# Patient Record
Sex: FEMALE | Race: OTHER | NOT HISPANIC OR LATINO | Employment: UNEMPLOYED | URBAN - METROPOLITAN AREA
[De-identification: names, ages, dates, MRNs, and addresses within clinical notes are randomized per-mention and may not be internally consistent; named-entity substitution may affect disease eponyms.]

---

## 2022-03-11 ENCOUNTER — HOSPITAL ENCOUNTER (EMERGENCY)
Facility: HOSPITAL | Age: 22
Discharge: HOME | End: 2022-03-11
Attending: EMERGENCY MEDICINE
Payer: COMMERCIAL

## 2022-03-11 VITALS
DIASTOLIC BLOOD PRESSURE: 68 MMHG | WEIGHT: 115 LBS | HEIGHT: 64 IN | HEART RATE: 92 BPM | SYSTOLIC BLOOD PRESSURE: 97 MMHG | OXYGEN SATURATION: 100 % | BODY MASS INDEX: 19.63 KG/M2 | TEMPERATURE: 98 F | RESPIRATION RATE: 16 BRPM

## 2022-03-11 DIAGNOSIS — T14.8XXA MUSCLE STRAIN: ICD-10-CM
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
## 2022-03-11 PROCEDURE — 99282 EMERGENCY DEPT VISIT SF MDM: CPT | Performed by: NURSE PRACTITIONER

## 2022-03-11 PROCEDURE — 99284 EMERGENCY DEPT VISIT MOD MDM: CPT

## 2022-03-11 RX ORDER — IBUPROFEN 600 MG/1
600 TABLET ORAL ONCE
Status: COMPLETED | OUTPATIENT
Start: 2022-03-11 | End: 2022-03-11

## 2022-03-11 RX ADMIN — IBUPROFEN 600 MG: 600 TABLET ORAL at 13:55
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
## 2022-03-23 ENCOUNTER — APPOINTMENT (EMERGENCY)
Dept: VASCULAR ULTRASOUND | Facility: HOSPITAL | Age: 22
End: 2022-03-23
Payer: COMMERCIAL

## 2022-03-23 ENCOUNTER — HOSPITAL ENCOUNTER (EMERGENCY)
Facility: HOSPITAL | Age: 22
Discharge: HOME | End: 2022-03-23
Attending: EMERGENCY MEDICINE
Payer: COMMERCIAL

## 2022-03-23 VITALS
HEART RATE: 96 BPM | SYSTOLIC BLOOD PRESSURE: 103 MMHG | TEMPERATURE: 98.6 F | RESPIRATION RATE: 18 BRPM | OXYGEN SATURATION: 99 % | DIASTOLIC BLOOD PRESSURE: 62 MMHG

## 2022-03-23 DIAGNOSIS — M79.605 LEFT LEG PAIN: Primary | ICD-10-CM

## 2022-03-23 PROCEDURE — 99284 EMERGENCY DEPT VISIT MOD MDM: CPT | Performed by: PHYSICIAN ASSISTANT

## 2022-03-23 PROCEDURE — 93971 EXTREMITY STUDY: CPT | Performed by: SURGERY

## 2022-03-23 PROCEDURE — 99283 EMERGENCY DEPT VISIT LOW MDM: CPT

## 2022-03-23 PROCEDURE — 93971 EXTREMITY STUDY: CPT

## 2022-03-23 RX ORDER — METHOCARBAMOL 750 MG/1
750 TABLET, FILM COATED ORAL EVERY 6 HOURS PRN
Qty: 20 TABLET | Refills: 0 | Status: SHIPPED | OUTPATIENT
Start: 2022-03-23 | End: 2022-03-28
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
## 2022-03-23 NOTE — Clinical Note
Juan David Meredith was seen and treated in our emergency department on 3/23/2022  Diagnosis:     Attila Walden  may return to work on return date  She may return on this date: 03/24/2022         If you have any questions or concerns, please don't hesitate to call        Patty Lewis RN    ______________________________           _______________          _______________  Hospital Representative                              Date                                Time

## 2022-03-31 ENCOUNTER — OFFICE VISIT (OUTPATIENT)
Dept: FAMILY MEDICINE CLINIC | Facility: CLINIC | Age: 22
End: 2022-03-31
Payer: COMMERCIAL

## 2022-03-31 ENCOUNTER — APPOINTMENT (OUTPATIENT)
Dept: RADIOLOGY | Facility: CLINIC | Age: 22
End: 2022-03-31
Payer: COMMERCIAL

## 2022-03-31 VITALS
TEMPERATURE: 96 F | HEART RATE: 101 BPM | DIASTOLIC BLOOD PRESSURE: 70 MMHG | OXYGEN SATURATION: 93 % | BODY MASS INDEX: 19.87 KG/M2 | WEIGHT: 116.4 LBS | HEIGHT: 64 IN | SYSTOLIC BLOOD PRESSURE: 112 MMHG

## 2022-03-31 DIAGNOSIS — M54.9 MID BACK PAIN: ICD-10-CM

## 2022-03-31 DIAGNOSIS — Z00.00 ENCOUNTER FOR MEDICAL EXAMINATION TO ESTABLISH CARE: ICD-10-CM

## 2022-03-31 DIAGNOSIS — M79.605 LEFT LEG PAIN: ICD-10-CM

## 2022-03-31 DIAGNOSIS — M79.605 LEFT LEG PAIN: Primary | ICD-10-CM

## 2022-03-31 DIAGNOSIS — Z13.220 SCREENING, LIPID: ICD-10-CM

## 2022-03-31 DIAGNOSIS — Z12.4 PAP SMEAR FOR CERVICAL CANCER SCREENING: ICD-10-CM

## 2022-03-31 DIAGNOSIS — Z13.1 SCREENING FOR DIABETES MELLITUS (DM): ICD-10-CM

## 2022-03-31 PROCEDURE — 72072 X-RAY EXAM THORAC SPINE 3VWS: CPT

## 2022-03-31 PROCEDURE — 99204 OFFICE O/P NEW MOD 45 MIN: CPT | Performed by: PHYSICIAN ASSISTANT

## 2022-03-31 PROCEDURE — 73590 X-RAY EXAM OF LOWER LEG: CPT
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
## 2022-04-01 ENCOUNTER — TELEPHONE (OUTPATIENT)
Dept: OBGYN CLINIC | Facility: HOSPITAL | Age: 22
End: 2022-04-01
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
## 2022-04-22 ENCOUNTER — OFFICE VISIT (OUTPATIENT)
Dept: OBGYN CLINIC | Facility: CLINIC | Age: 22
End: 2022-04-22
Payer: COMMERCIAL

## 2022-04-22 VITALS
HEART RATE: 89 BPM | DIASTOLIC BLOOD PRESSURE: 76 MMHG | WEIGHT: 115 LBS | SYSTOLIC BLOOD PRESSURE: 109 MMHG | BODY MASS INDEX: 19.63 KG/M2 | HEIGHT: 64 IN

## 2022-04-22 DIAGNOSIS — M62.838 TRAPEZIUS MUSCLE SPASM: ICD-10-CM

## 2022-04-22 DIAGNOSIS — S39.012A LUMBAR STRAIN, INITIAL ENCOUNTER: ICD-10-CM

## 2022-04-22 DIAGNOSIS — S16.1XXA CERVICAL STRAIN, INITIAL ENCOUNTER: Primary | ICD-10-CM

## 2022-04-22 DIAGNOSIS — S29.012A STRAIN OF THORACIC BACK REGION: ICD-10-CM

## 2022-04-22 DIAGNOSIS — S80.02XA CONTUSION OF LEFT KNEE, INITIAL ENCOUNTER: ICD-10-CM

## 2022-04-22 DIAGNOSIS — M62.838 CERVICAL PARASPINAL MUSCLE SPASM: ICD-10-CM

## 2022-04-22 PROCEDURE — 99203 OFFICE O/P NEW LOW 30 MIN: CPT | Performed by: FAMILY MEDICINE

## 2022-04-22 RX ORDER — NAPROXEN 500 MG/1
500 TABLET ORAL 2 TIMES DAILY WITH MEALS
Qty: 60 TABLET | Refills: 0 | Status: SHIPPED | OUTPATIENT
Start: 2022-04-22
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
## 2022-05-27 ENCOUNTER — OFFICE VISIT (OUTPATIENT)
Dept: OBGYN CLINIC | Facility: CLINIC | Age: 22
End: 2022-05-27
Payer: COMMERCIAL

## 2022-05-27 VITALS
BODY MASS INDEX: 19.81 KG/M2 | HEART RATE: 83 BPM | WEIGHT: 116 LBS | HEIGHT: 64 IN | SYSTOLIC BLOOD PRESSURE: 100 MMHG | DIASTOLIC BLOOD PRESSURE: 69 MMHG

## 2022-05-27 DIAGNOSIS — S29.012A STRAIN OF THORACIC BACK REGION: ICD-10-CM

## 2022-05-27 DIAGNOSIS — S39.012D LUMBAR STRAIN, SUBSEQUENT ENCOUNTER: ICD-10-CM

## 2022-05-27 DIAGNOSIS — M62.838 TRAPEZIUS MUSCLE SPASM: ICD-10-CM

## 2022-05-27 DIAGNOSIS — S80.02XD CONTUSION OF LEFT KNEE, SUBSEQUENT ENCOUNTER: ICD-10-CM

## 2022-05-27 DIAGNOSIS — S16.1XXD CERVICAL STRAIN, SUBSEQUENT ENCOUNTER: Primary | ICD-10-CM

## 2022-05-27 DIAGNOSIS — M62.838 CERVICAL PARASPINAL MUSCLE SPASM: ICD-10-CM

## 2022-05-27 PROCEDURE — 99213 OFFICE O/P EST LOW 20 MIN: CPT | Performed by: FAMILY MEDICINE

## 2022-05-27 RX ORDER — NAPROXEN 500 MG/1
500 TABLET ORAL 2 TIMES DAILY WITH MEALS
Qty: 60 TABLET | Refills: 0 | Status: SHIPPED | OUTPATIENT
Start: 2022-05-27

## 2022-05-27 NOTE — PATIENT INSTRUCTIONS
Prescription for Naproxen 500 mg sent to pharmacy  Take twice day with food, everyday for 2 weeks, and then take as needed    Alternate between heat and ice    Youtube- neck and back stretching exercises by physical therapy
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
## 2023-01-09 ENCOUNTER — TELEPHONE (OUTPATIENT)
Dept: OBGYN CLINIC | Facility: HOSPITAL | Age: 23
End: 2023-01-09

## 2023-01-09 NOTE — TELEPHONE ENCOUNTER
Caller: Ashley    Doctor: orion    Reason for call: patient calling to request medical records number    Call back#: na

## 2023-01-20 NOTE — TELEPHONE ENCOUNTER
Caller: Patient    Doctor: Steven Bateman    Reason for call: Patient requested update on med records request  Transferred to Medical records    Call back#: [phone]

## 2023-03-10 ENCOUNTER — TELEPHONE (OUTPATIENT)
Dept: OBGYN CLINIC | Facility: HOSPITAL | Age: 23
End: 2023-03-10

## 2023-03-10 NOTE — TELEPHONE ENCOUNTER
Caller: Arielle    Doctor: Jaimee Brown    Reason for call: they are calling to confirm the dates of service for our office    They will be reaching out to [facility]    Call back#:

## 2023-03-31 ENCOUNTER — TELEPHONE (OUTPATIENT)
Dept: OBGYN CLINIC | Facility: MEDICAL CENTER | Age: 23
End: 2023-03-31

## 2023-03-31 NOTE — TELEPHONE ENCOUNTER
Caller: Clive Deleon from [employer]    Doctor: Patricia Arreola    Reason for call:     Clive Deleon from [employer] calling to confirm if we received medical records record, did not see anything form this company, he will resend      Call back#: n/a
